# Patient Record
Sex: FEMALE | Race: OTHER | URBAN - METROPOLITAN AREA
[De-identification: names, ages, dates, MRNs, and addresses within clinical notes are randomized per-mention and may not be internally consistent; named-entity substitution may affect disease eponyms.]

---

## 2017-02-08 ENCOUNTER — APPOINTMENT (OUTPATIENT)
Dept: URBAN - METROPOLITAN AREA SURGERY 9 | Age: 67
Setting detail: DERMATOLOGY
End: 2017-02-08

## 2017-02-08 DIAGNOSIS — Z41.9 ENCOUNTER FOR PROCEDURE FOR PURPOSES OTHER THAN REMEDYING HEALTH STATE, UNSPECIFIED: ICD-10-CM

## 2017-02-08 PROCEDURE — OTHER MEDICAL CONSULTATION: PRODUCTS: OTHER

## 2017-02-28 ENCOUNTER — APPOINTMENT (OUTPATIENT)
Dept: URBAN - METROPOLITAN AREA SURGERY 9 | Age: 67
Setting detail: DERMATOLOGY
End: 2017-03-08

## 2017-02-28 DIAGNOSIS — D485 NEOPLASM OF UNCERTAIN BEHAVIOR OF SKIN: ICD-10-CM

## 2017-02-28 PROBLEM — D48.5 NEOPLASM OF UNCERTAIN BEHAVIOR OF SKIN: Status: ACTIVE | Noted: 2017-02-28

## 2017-02-28 PROCEDURE — 88305 TISSUE EXAM BY PATHOLOGIST: CPT

## 2017-02-28 PROCEDURE — OTHER PATHOLOGY BILLING: OTHER

## 2017-02-28 PROCEDURE — OTHER BIOPSY BY SHAVE METHOD: OTHER

## 2017-02-28 ASSESSMENT — LOCATION SIMPLE DESCRIPTION DERM
LOCATION SIMPLE: RIGHT RING FINGER
LOCATION SIMPLE: LEFT RING FINGER

## 2017-02-28 ASSESSMENT — LOCATION ZONE DERM: LOCATION ZONE: FINGER

## 2017-02-28 ASSESSMENT — LOCATION DETAILED DESCRIPTION DERM
LOCATION DETAILED: RIGHT PROXIMAL DORSAL RING FINGER
LOCATION DETAILED: LEFT PROXIMAL DORSAL RING FINGER

## 2017-02-28 NOTE — PROCEDURE: BIOPSY BY SHAVE METHOD
Body Location Override (Optional - Billing Will Still Be Based On Selected Body Map Location If Applicable): Left ring finger

## 2017-02-28 NOTE — PROCEDURE: BIOPSY BY SHAVE METHOD
Body Location Override (Optional - Billing Will Still Be Based On Selected Body Map Location If Applicable): Right ring finger

## 2017-07-27 ENCOUNTER — APPOINTMENT (OUTPATIENT)
Dept: URBAN - METROPOLITAN AREA SURGERY 9 | Age: 67
Setting detail: DERMATOLOGY
End: 2017-09-05

## 2017-07-27 DIAGNOSIS — Z41.9 ENCOUNTER FOR PROCEDURE FOR PURPOSES OTHER THAN REMEDYING HEALTH STATE, UNSPECIFIED: ICD-10-CM

## 2017-07-27 PROCEDURE — OTHER MEDICAL CONSULTATION: PRODUCTS: OTHER

## 2017-08-04 ENCOUNTER — APPOINTMENT (OUTPATIENT)
Dept: URBAN - METROPOLITAN AREA SURGERY 9 | Age: 67
Setting detail: DERMATOLOGY
End: 2017-09-05

## 2017-08-04 DIAGNOSIS — Z41.9 ENCOUNTER FOR PROCEDURE FOR PURPOSES OTHER THAN REMEDYING HEALTH STATE, UNSPECIFIED: ICD-10-CM

## 2017-08-04 PROCEDURE — OTHER CHEMICAL PEEL: OTHER

## 2017-08-04 ASSESSMENT — LOCATION DETAILED DESCRIPTION DERM: LOCATION DETAILED: RIGHT FOREHEAD

## 2017-08-04 ASSESSMENT — LOCATION SIMPLE DESCRIPTION DERM: LOCATION SIMPLE: RIGHT FOREHEAD

## 2017-08-04 ASSESSMENT — LOCATION ZONE DERM: LOCATION ZONE: FACE

## 2017-08-04 NOTE — PROCEDURE: CHEMICAL PEEL
Post Peel Care: After the procedure, spf was applied to the treated areas. post-care instructions were reviewed with the patient. Pt tolerated well.
Detail Level: Zone
Treatment Number: 0
Price (Use Numbers Only, No Special Characters Or $): 90
Post-Care Instructions: I reviewed with the patient in detail post-care instructions. Patient should avoid sun exposure and wear sun protection.
Chemical Peel: Skin Medica Vitalize
Treatment Time (Optional): 20 min
Prep: The treated area was degreased with pre-peel cleanser, and vaseline was applied for protection of mucous membranes.
Consent: Prior to the procedure, written consent was obtained and risks were reviewed, including but not limited to: redness, peeling, blistering, pigmentary change, scarring, infection, and pain.

## 2018-10-19 ENCOUNTER — APPOINTMENT (OUTPATIENT)
Dept: URBAN - METROPOLITAN AREA SURGERY 9 | Age: 68
Setting detail: DERMATOLOGY
End: 2018-11-14

## 2018-10-19 DIAGNOSIS — D485 NEOPLASM OF UNCERTAIN BEHAVIOR OF SKIN: ICD-10-CM

## 2018-10-19 PROBLEM — D48.5 NEOPLASM OF UNCERTAIN BEHAVIOR OF SKIN: Status: ACTIVE | Noted: 2018-10-19

## 2018-10-19 PROCEDURE — OTHER NAIL CLIPPING FOR PATHOLOGY: OTHER

## 2018-10-19 ASSESSMENT — LOCATION SIMPLE DESCRIPTION DERM: LOCATION SIMPLE: LEFT GREAT TOE

## 2018-10-19 ASSESSMENT — LOCATION DETAILED DESCRIPTION DERM: LOCATION DETAILED: LEFT GREAT TOENAIL

## 2018-10-19 ASSESSMENT — LOCATION ZONE DERM: LOCATION ZONE: TOENAIL

## 2018-10-19 NOTE — PROCEDURE: NAIL CLIPPING FOR PATHOLOGY
Billing Type: Third-Party Bill
Add [id number] To Bill?: No
Body Location Override (Optional - Billing Will Still Be Based On Selected Body Map Location If Applicable): Left great toenail
Detail Level: Detailed

## 2019-01-17 ENCOUNTER — APPOINTMENT (OUTPATIENT)
Dept: URBAN - METROPOLITAN AREA SURGERY 9 | Age: 69
Setting detail: DERMATOLOGY
End: 2019-01-17

## 2019-01-17 DIAGNOSIS — Z41.9 ENCOUNTER FOR PROCEDURE FOR PURPOSES OTHER THAN REMEDYING HEALTH STATE, UNSPECIFIED: ICD-10-CM

## 2019-01-17 PROCEDURE — OTHER PRESCRIPTION: OTHER

## 2019-01-17 PROCEDURE — OTHER PRODUCT LINE (PHARMACEUTICALS): OTHER

## 2019-01-17 PROCEDURE — OTHER COSMETIC CONSULTATION: LASER RESURFACING: OTHER

## 2019-01-17 PROCEDURE — OTHER OTHER: OTHER

## 2019-01-17 PROCEDURE — OTHER PRODUCT LINE (ANTI-AGING): OTHER

## 2019-01-17 RX ORDER — TRETINOIN 0.25 MG/G
CREAM TOPICAL QHS
Qty: 1 | Refills: 5 | COMMUNITY
Start: 2019-01-17 | End: 2019-07-02

## 2019-01-17 NOTE — PROCEDURE: PRODUCT LINE (ANTI-AGING)
Product 46 Units: 0
Product 5 Application Directions: qhs to face; retinoid handout given to patient
Product 12 Application Directions: apply bid to eyelids
Product 44 Price (In Dollars - Numeric Only, No Special Characters Or $): 0.00
Product 22 Price (In Dollars - Numeric Only, No Special Characters Or $): 28.00
Product 20 Price (In Dollars - Numeric Only, No Special Characters Or $): 48.00
Product 27 Price (In Dollars - Numeric Only, No Special Characters Or $): 38.00
Product 10 Price (In Dollars - Numeric Only, No Special Characters Or $): 58.00
Product 3 Price (In Dollars - Numeric Only, No Special Characters Or $): 30.00
Name Of Product 24: Skin Better Science Alpha-Ret Intense
Product 15 Price (In Dollars - Numeric Only, No Special Characters Or $): 150.00
Name Of Product 26: Alto Serum
Product 18 Price (In Dollars - Numeric Only, No Special Characters Or $): 116.00
Name Of Product 16: Avene Clean-AC moisturizer
Name Of Product 6: Neocutis Biocream
Name Of Product 13: Velvet Patterson
Name Of Product 1: Skin Better Science AlphaRet
Product 20 Application Directions: bid to face
Product 3 Application Directions: qd
Product 10 Application Directions: apply bid
Product 22 Application Directions: use to wash face twice a day
Product 1 Price (In Dollars - Numeric Only, No Special Characters Or $): 110.00
Product 27 Application Directions: use bid to wash face
Product 26 Price (In Dollars - Numeric Only, No Special Characters Or $): 145.00
Product 13 Price (In Dollars - Numeric Only, No Special Characters Or $): 99.00
Product 8 Price (In Dollars - Numeric Only, No Special Characters Or $): 172.00
Product 16 Price (In Dollars - Numeric Only, No Special Characters Or $): 20.00
Name Of Product 4: Neocutis Lumiere Eye Cream
Product 18 Application Directions: Apply bid to face and once a week to neck and chest
Name Of Product 21: Avene Anti-Benigno FORT
Name Of Product 11: Obagi 20% vit C serum
Name Of Product 28: Skin Medica Dermal Repair Cream
Product 1 Application Directions: apply qhs to face; retinoid handout given to patient
Product 6 Application Directions: apply qd-bid
Product 13 Application Directions: apply qd-bid around eyes
Detail Level: Zone
Product 4 Price (In Dollars - Numeric Only, No Special Characters Or $): 80.00
Name Of Product 19: Environ C-[facility] 2
Name Of Product 23: [place]
Product 24 Application Directions: apply to affected areas qhs; retinoid handout given
Product 28 Price (In Dollars - Numeric Only, No Special Characters Or $): 129.00
Name Of Product 2: Obagi Hydrate Luxe face cream
Name Of Product 7: Avene Tolerance Extreme moisturizer
Name Of Product 14: Neocutis Bioserum
Name Of Product 9: Avene cleansing foam
Product 19 Price (In Dollars - Numeric Only, No Special Characters Or $): 121.00
Name Of Product 17: Avene anti-rogeurs Cleansing Milk
Product 23 Price (In Dollars - Numeric Only, No Special Characters Or $): 165.00
Allow Plan To Count Towards E/M Coding: Yes
Name Of Product 25: Tretinoin 0.1% cream
Product 11 Application Directions: apply daily to face and neck
Product 2 Price (In Dollars - Numeric Only, No Special Characters Or $): 72
Product 21 Application Directions: use twice a day to moisturize face
Product 7 Price (In Dollars - Numeric Only, No Special Characters Or $): 38
Product 14 Price (In Dollars - Numeric Only, No Special Characters Or $): 225.00
Product 28 Application Directions: apply bid to face and neck
Product 2 Units: 1
Product 19 Application Directions: Apply qhs to face; retinoid handout given
Product 26 Application Directions: Apply qam
Name Of Product 5: Skin Medica Retinol 1%
Name Of Product 12: Skin Better Science Eye cream
Product 25 Price (In Dollars - Numeric Only, No Special Characters Or $): 102.00
Name Of Product 22: Avene Anti-Rogeurs Cleanser Milk
Product 14 Application Directions: apply qd to face
Product 12 Price (In Dollars - Numeric Only, No Special Characters Or $): 100.00
Product 23 Application Directions: Use bid to moisturize face and neck
Name Of Product 27: Skin Medica Facial Cleanser
Product 5 Price (In Dollars - Numeric Only, No Special Characters Or $): 90.00
Name Of Product 20: Avene Anti-Rogeurs FORT moisturizer
Name Of Product 8: TNS Recovery complex
Name Of Product 3: Elta MD SPF 46 clear
Name Of Product 10: SkinMedica Rejuvenative moisturizer
Product 25 Application Directions: apply qhs to face; Retinoid handout given to patient
Name Of Product 15: Sente Dermal Repair Cream
Name Of Product 18: Environ C-quence 1
Product 15 Application Directions: Apply bid to face

## 2019-01-17 NOTE — PROCEDURE: OTHER
Other (Free Text): 75% discount on treatment cost
Note Text (......Xxx Chief Complaint.): This diagnosis correlates with the
Detail Level: Detailed

## 2019-01-17 NOTE — PROCEDURE: PRODUCT LINE (PHARMACEUTICALS)
Product 5 Application Directions: Apply to affected area daily. Handout given
Product 8 Price (In Dollars - Numeric Only, No Special Characters Or $): 0.00
Product 3 Units: 0
Product 1 Price (In Dollars - Numeric Only, No Special Characters Or $): 120
Product 3 Application Directions: apply as directed
Detail Level: Zone
Name Of Product 4: Tretinoin 0.1%
Product 1 Application Directions: apply qd to affected area
Name Of Product 6: Tretinoin 0.05%
Name Of Product 2: Latisse 5ML
Product 4 Price (In Dollars - Numeric Only, No Special Characters Or $): 102.00
Product 6 Price (In Dollars - Numeric Only, No Special Characters Or $): 91.00
Allow Plan To Count Towards E/M Coding: Yes
Product 2 Price (In Dollars - Numeric Only, No Special Characters Or $): 179.00
Product 4 Application Directions: apply qhs to face; Retinoid handout given to patient
Product 6 Application Directions: apply to affected areas qhs.  Retinoid handout given.  discontinue if pregnancy suspected
Name Of Product 5: Tretinion 0.025% 20 g
Name Of Product 7: Hydro-Q 4%
Name Of Product 3: Latisse 5ML x 2
Product 7 Price (In Dollars - Numeric Only, No Special Characters Or $): 69.90
Product 5 Price (In Dollars - Numeric Only, No Special Characters Or $): 84.00
Product 3 Price (In Dollars - Numeric Only, No Special Characters Or $): 300
Name Of Product 1: Latisse 3ML
Product 7 Application Directions: Use as directed.  Bleaching cream handout given
Product 5 Units: 1

## 2019-07-02 ENCOUNTER — APPOINTMENT (OUTPATIENT)
Dept: URBAN - METROPOLITAN AREA SURGERY 9 | Age: 69
Setting detail: DERMATOLOGY
End: 2019-07-09

## 2019-07-02 DIAGNOSIS — L57.8 OTHER SKIN CHANGES DUE TO CHRONIC EXPOSURE TO NONIONIZING RADIATION: ICD-10-CM

## 2019-07-02 DIAGNOSIS — I78.1 NEVUS, NON-NEOPLASTIC: ICD-10-CM

## 2019-07-02 DIAGNOSIS — L71.0 PERIORAL DERMATITIS: ICD-10-CM

## 2019-07-02 PROCEDURE — OTHER PRESCRIPTION: OTHER

## 2019-07-02 PROCEDURE — OTHER TREATMENT REGIMEN: OTHER

## 2019-07-02 PROCEDURE — 99212 OFFICE O/P EST SF 10 MIN: CPT

## 2019-07-02 PROCEDURE — OTHER COUNSELING: OTHER

## 2019-07-02 PROCEDURE — OTHER REASSURANCE: OTHER

## 2019-07-02 RX ORDER — CLINDAMYCIN PHOSPHATE 10 MG/G
GEL TOPICAL
Qty: 1 | Refills: 2 | Status: ERX | COMMUNITY
Start: 2019-07-02

## 2019-07-02 RX ORDER — AMOXICILLIN 500 MG/1
TABLET, FILM COATED ORAL
Qty: 60 | Refills: 1 | Status: ERX | COMMUNITY
Start: 2019-07-02

## 2019-07-02 ASSESSMENT — LOCATION SIMPLE DESCRIPTION DERM
LOCATION SIMPLE: NOSE
LOCATION SIMPLE: RIGHT NOSE
LOCATION SIMPLE: LEFT NOSE
LOCATION SIMPLE: UPPER LIP

## 2019-07-02 ASSESSMENT — LOCATION DETAILED DESCRIPTION DERM
LOCATION DETAILED: NASAL SUPRATIP
LOCATION DETAILED: PHILTRUM
LOCATION DETAILED: LEFT NASAL ALA
LOCATION DETAILED: RIGHT NASAL ALA

## 2019-07-02 ASSESSMENT — LOCATION ZONE DERM
LOCATION ZONE: LIP
LOCATION ZONE: NOSE

## 2019-07-02 NOTE — PROCEDURE: TREATMENT REGIMEN
Initiate Treatment: Clindamycin gel as directed\\nSent in Amoxicillin 500 mg( pt to call to fill)
Detail Level: Simple
Plan: Recommends treating area with 5FU first in the winter; I feel she has some mild AKs along with solar lentigines.  We'll first treat with 5-FU, possibly entire face and then get onto a peel program, along with hydro-q/tretinoin regimen

## 2019-07-02 NOTE — HPI: RASH
Is This A New Presentation, Or A Follow-Up?: Rash
Additional History: Initially treated with otc cortisone, did not help.Uses metronidazole 0.75% twice daily , stopped a few days ago. Had use acanya in the past.

## 2019-07-02 NOTE — HPI: SECONDARY COMPLAINT
Additional History:  thinks it’s a vein
Additional History: Wants to talk about cosmetic treatment to brown spots on upper lip

## 2019-07-02 NOTE — PROCEDURE: REASSURANCE
Hide Additional Notes?: No
Detail Level: Detailed
Additional Notes (Optional): Recommends that if pt wants treated, to do this in the fall (best time for healing).

## 2019-07-22 ENCOUNTER — APPOINTMENT (OUTPATIENT)
Dept: URBAN - METROPOLITAN AREA SURGERY 9 | Age: 69
Setting detail: DERMATOLOGY
End: 2019-07-22

## 2019-07-22 DIAGNOSIS — I78.1 NEVUS, NON-NEOPLASTIC: ICD-10-CM

## 2019-07-22 PROCEDURE — OTHER EXCEL V LASER: OTHER

## 2019-07-22 ASSESSMENT — LOCATION ZONE DERM: LOCATION ZONE: NOSE

## 2019-07-22 ASSESSMENT — LOCATION DETAILED DESCRIPTION DERM: LOCATION DETAILED: NASAL TIP

## 2019-07-22 ASSESSMENT — LOCATION SIMPLE DESCRIPTION DERM: LOCATION SIMPLE: NOSE

## 2019-07-22 NOTE — PROCEDURE: EXCEL V LASER
Post-Care Instructions: I reviewed with the patient in detail post-care instructions. Patient is to apply vaseline with a q-tip to all crusted areas, and avoid picking at any scabs. Pt should stay away from the sun and wear sun protection until fully healed.
Spot Size: 5
Temperature: 10 C
Procedural Text: The treatment areas where then treated as noted above.
Treatment Number (Will Not Render If 0): 1
Fluence In J: 11
Detail Level: Detailed
Consent: Written consent obtained, risks reviewed including but not limited to crusting, scabbing, blistering, scarring, darker or lighter pigmentary change, and/or incomplete removal.
Post-Procedure Text: Following the treatment, Aquaphor and broad spectrum sunscreen was applied to the treatment areas.  Post-care instructions were discussed.
Laser Type: KTP 532nm
Pulse Width In Msec: 10
Pre-Procedure Text: After consent was obtained and the procedure was explained, all persons present in the exam room put on their protective eyewear. Cold gel was applied to the treatment areas.
Price (Use Numbers Only, No Special Characters Or $): 0

## 2019-12-12 ENCOUNTER — APPOINTMENT (OUTPATIENT)
Dept: URBAN - METROPOLITAN AREA SURGERY 9 | Age: 69
Setting detail: DERMATOLOGY
End: 2019-12-12

## 2019-12-12 DIAGNOSIS — L60.8 OTHER NAIL DISORDERS: ICD-10-CM

## 2019-12-12 DIAGNOSIS — L64.8 OTHER ANDROGENIC ALOPECIA: ICD-10-CM

## 2019-12-12 PROCEDURE — OTHER COUNSELING: OTHER

## 2019-12-12 PROCEDURE — OTHER ORDER TESTS: OTHER

## 2019-12-12 PROCEDURE — 99213 OFFICE O/P EST LOW 20 MIN: CPT

## 2019-12-12 PROCEDURE — OTHER TREATMENT REGIMEN: OTHER

## 2019-12-12 ASSESSMENT — LOCATION SIMPLE DESCRIPTION DERM
LOCATION SIMPLE: RIGHT INDEX FINGERNAIL
LOCATION SIMPLE: ANTERIOR SCALP

## 2019-12-12 ASSESSMENT — LOCATION DETAILED DESCRIPTION DERM
LOCATION DETAILED: RIGHT INDEX FINGERNAIL
LOCATION DETAILED: MID-FRONTAL SCALP

## 2019-12-12 ASSESSMENT — LOCATION ZONE DERM
LOCATION ZONE: FINGERNAIL
LOCATION ZONE: SCALP

## 2019-12-12 NOTE — PROCEDURE: TREATMENT REGIMEN
Otc Regimen: Rogaine 5% solution or foam once daily\\nFull again by Sher Titus on [employer] \\nVivascal and/or collagen powder(vital proteins at [facility]) \\nTopix Otc Regimen: Rogaine 5% solution or foam once daily\\nFull again by Sher Titus on [employer] \\nVivascal and/or collagen powder(vital proteins at [employer]) \\nTopix

## 2019-12-12 NOTE — HPI: HAIR LOSS
Previous Labs: No
How Did The Hair Loss Occur?: gradual in onset
What Hair Products Do You Use?: Several different shampoos

## 2020-03-17 ENCOUNTER — APPOINTMENT (OUTPATIENT)
Dept: URBAN - METROPOLITAN AREA SURGERY 9 | Age: 70
Setting detail: DERMATOLOGY
End: 2020-03-17

## 2020-03-17 DIAGNOSIS — L65.0 TELOGEN EFFLUVIUM: ICD-10-CM

## 2020-03-17 DIAGNOSIS — L21.8 OTHER SEBORRHEIC DERMATITIS: ICD-10-CM

## 2020-03-17 DIAGNOSIS — L57.0 ACTINIC KERATOSIS: ICD-10-CM

## 2020-03-17 PROCEDURE — OTHER PRESCRIPTION: OTHER

## 2020-03-17 PROCEDURE — 99213 OFFICE O/P EST LOW 20 MIN: CPT | Mod: 25

## 2020-03-17 PROCEDURE — OTHER COUNSELING: OTHER

## 2020-03-17 PROCEDURE — 17000 DESTRUCT PREMALG LESION: CPT

## 2020-03-17 PROCEDURE — OTHER LIQUID NITROGEN: OTHER

## 2020-03-17 PROCEDURE — OTHER TREATMENT REGIMEN: OTHER

## 2020-03-17 RX ORDER — FLUOCINONIDE 0.5 MG/ML
SOLUTION TOPICAL
Qty: 1 | Refills: 2 | Status: ERX | COMMUNITY
Start: 2020-03-17

## 2020-03-17 RX ORDER — CICLOPIROX 10 MG/.96ML
SHAMPOO TOPICAL
Qty: 1 | Refills: 5 | Status: ERX | COMMUNITY
Start: 2020-03-17

## 2020-03-17 ASSESSMENT — LOCATION DETAILED DESCRIPTION DERM
LOCATION DETAILED: MID-FRONTAL SCALP
LOCATION DETAILED: MID-OCCIPITAL SCALP
LOCATION DETAILED: LEFT SUPERIOR FOREHEAD

## 2020-03-17 ASSESSMENT — LOCATION SIMPLE DESCRIPTION DERM
LOCATION SIMPLE: POSTERIOR SCALP
LOCATION SIMPLE: LEFT FOREHEAD
LOCATION SIMPLE: ANTERIOR SCALP

## 2020-03-17 ASSESSMENT — LOCATION ZONE DERM
LOCATION ZONE: SCALP
LOCATION ZONE: FACE

## 2020-03-17 NOTE — PROCEDURE: TREATMENT REGIMEN
Otc Regimen: Rogaine 5% solution or foam once daily\\nVivascal and/or collagen powder(vital proteins at [facility]) \\nTopix Otc Regimen: Rogaine 5% solution or foam once daily\\nVivascal and/or collagen powder(vital proteins at [employer]) \\nTopix

## 2020-10-15 ENCOUNTER — APPOINTMENT (OUTPATIENT)
Dept: URBAN - METROPOLITAN AREA SURGERY 9 | Age: 70
Setting detail: DERMATOLOGY
End: 2020-10-15

## 2020-10-15 DIAGNOSIS — H01.13 ECZEMATOUS DERMATITIS OF EYELID: ICD-10-CM

## 2020-10-15 DIAGNOSIS — L29.2 PRURITUS VULVAE: ICD-10-CM

## 2020-10-15 PROBLEM — H01.134 ECZEMATOUS DERMATITIS OF LEFT UPPER EYELID: Status: ACTIVE | Noted: 2020-10-15

## 2020-10-15 PROBLEM — H01.132 ECZEMATOUS DERMATITIS OF RIGHT LOWER EYELID: Status: ACTIVE | Noted: 2020-10-15

## 2020-10-15 PROBLEM — H01.131 ECZEMATOUS DERMATITIS OF RIGHT UPPER EYELID: Status: ACTIVE | Noted: 2020-10-15

## 2020-10-15 PROBLEM — H01.135 ECZEMATOUS DERMATITIS OF LEFT LOWER EYELID: Status: ACTIVE | Noted: 2020-10-15

## 2020-10-15 PROCEDURE — OTHER PRESCRIPTION: OTHER

## 2020-10-15 PROCEDURE — OTHER COUNSELING: OTHER

## 2020-10-15 PROCEDURE — 99213 OFFICE O/P EST LOW 20 MIN: CPT

## 2020-10-15 PROCEDURE — OTHER TREATMENT REGIMEN: OTHER

## 2020-10-15 RX ORDER — TACROLIMUS 1 MG/G
OINTMENT TOPICAL BID
Qty: 1 | Refills: 3 | Status: ERX | COMMUNITY
Start: 2020-10-15

## 2020-10-15 RX ORDER — DESONIDE 0.5 MG/G
OINTMENT TOPICAL
Qty: 1 | Refills: 1 | Status: ERX | COMMUNITY
Start: 2020-10-15

## 2020-10-15 ASSESSMENT — LOCATION DETAILED DESCRIPTION DERM
LOCATION DETAILED: LEFT LATERAL INFERIOR EYELID
LOCATION DETAILED: LEFT MEDIAL SUPERIOR EYELID
LOCATION DETAILED: RIGHT LATERAL INFERIOR EYELID
LOCATION DETAILED: RIGHT MEDIAL SUPERIOR EYELID
LOCATION DETAILED: LEFT LABIUM MAJUS
LOCATION DETAILED: RIGHT MEDIAL SUPERIOR EYELID

## 2020-10-15 ASSESSMENT — SEVERITY ASSESSMENT: SEVERITY: MILD

## 2020-10-15 ASSESSMENT — LOCATION ZONE DERM
LOCATION ZONE: EYELID
LOCATION ZONE: EYELID
LOCATION ZONE: VULVA

## 2020-10-15 ASSESSMENT — LOCATION SIMPLE DESCRIPTION DERM
LOCATION SIMPLE: RIGHT INFERIOR EYELID
LOCATION SIMPLE: LABIA MAJORA
LOCATION SIMPLE: LEFT INFERIOR EYELID
LOCATION SIMPLE: RIGHT SUPERIOR EYELID
LOCATION SIMPLE: RIGHT SUPERIOR EYELID
LOCATION SIMPLE: LEFT SUPERIOR EYELID

## 2020-10-15 NOTE — PROCEDURE: TREATMENT REGIMEN
Initiate Treatment: Protopic ointment as directed
Otc Regimen: Start mild moisturizer and continue skin medica cleanser
Samples Given: [place] Posay moisturizers
Detail Level: Simple
Plan: Consider washing mask with All Free & Clear detergent\\nInstructed to call for metronidazole gel if needed for persistent inflammatory papules on cheeks
Initiate Treatment: Alternate desonide ointment with protopic (one week with one, then one week another)
Detail Level: Zone
Otc Regimen: coconut oil; avoid rough tissue paper, soaps

## 2020-11-02 ENCOUNTER — APPOINTMENT (OUTPATIENT)
Dept: URBAN - METROPOLITAN AREA SURGERY 9 | Age: 70
Setting detail: DERMATOLOGY
End: 2020-11-02

## 2020-11-02 DIAGNOSIS — L259 CONTACT DERMATITIS AND OTHER ECZEMA, UNSPECIFIED CAUSE: ICD-10-CM

## 2020-11-02 DIAGNOSIS — B88.0 OTHER ACARIASIS: ICD-10-CM

## 2020-11-02 PROBLEM — L23.9 ALLERGIC CONTACT DERMATITIS, UNSPECIFIED CAUSE: Status: ACTIVE | Noted: 2020-11-02

## 2020-11-02 PROCEDURE — 87220 TISSUE EXAM FOR FUNGI: CPT

## 2020-11-02 PROCEDURE — OTHER KOH PREP: OTHER

## 2020-11-02 PROCEDURE — 99213 OFFICE O/P EST LOW 20 MIN: CPT | Mod: 25

## 2020-11-02 PROCEDURE — OTHER PATCH TESTING: OTHER

## 2020-11-02 PROCEDURE — OTHER PRESCRIPTION: OTHER

## 2020-11-02 PROCEDURE — OTHER TREATMENT REGIMEN: OTHER

## 2020-11-02 PROCEDURE — 95044 PATCH/APPLICATION TESTS: CPT

## 2020-11-02 PROCEDURE — OTHER COUNSELING: OTHER

## 2020-11-02 RX ORDER — PERMETHRIN 50 MG/G
CREAM TOPICAL
Qty: 1 | Refills: 1 | Status: ERX | COMMUNITY
Start: 2020-11-02

## 2020-11-02 ASSESSMENT — LOCATION SIMPLE DESCRIPTION DERM
LOCATION SIMPLE: LEFT CHEEK
LOCATION SIMPLE: LEFT SUPERIOR EYELID
LOCATION SIMPLE: RIGHT CHEEK
LOCATION SIMPLE: RIGHT SUPERIOR EYELID

## 2020-11-02 ASSESSMENT — LOCATION DETAILED DESCRIPTION DERM
LOCATION DETAILED: LEFT SUPERIOR CENTRAL MALAR CHEEK
LOCATION DETAILED: RIGHT CENTRAL MALAR CHEEK
LOCATION DETAILED: LEFT MEDIAL SUPERIOR EYELID
LOCATION DETAILED: RIGHT MEDIAL SUPERIOR EYELID

## 2020-11-02 ASSESSMENT — LOCATION ZONE DERM
LOCATION ZONE: EYELID
LOCATION ZONE: FACE

## 2020-11-02 NOTE — PROCEDURE: PATCH TESTING
Consent: Risks reviewed (including but not limited to rash, itching, allergic reaction, systemic rash, remote possibility of anaphylaxis to allergen.)
Detail Level: Zone
Post-Care Instructions: I reviewed with the patient in detail post-care instructions. Patient should try to avoid sweating, picking at, or getting patches wet for 48 hours.
Number Of Patches (Maximum Allowable Per Dos By Cms Is 90): 36

## 2020-11-02 NOTE — PROCEDURE: TREATMENT REGIMEN
Continue Regimen: Protopic ointment as directed
Otc Regimen: Continue mild moisturizer and cleansers
Detail Level: Simple
Plan: Recommended the patient undergo patch testing since persistent and recurred rapidly with stopping tacrolimus
Initiate Treatment: Permethrin cream nightly to cheeks for up to 1 month, can stop once rash clears. Resume PRN.
Plan: If not improving in 1 month advised patient to call for an alternate Rx treatment
Detail Level: Zone

## 2020-11-02 NOTE — PROCEDURE: KOH PREP
Body Location Override (Optional - Billing Will Still Be Based On Selected Body Map Location If Applicable): left cheek
Detail Level: Simple
Showing: no pathologic findings
Koh Intro Text (From The.....): A KOH prep was ordered and evaluated from the
Koh Procedure Text (Tissue Harvesting Technique): A glass slide was used to scrape the skin. The skin scrapings were placed on a glass slide, covered with a coverslip and a KOH solution was applied.

## 2020-11-04 ENCOUNTER — APPOINTMENT (OUTPATIENT)
Dept: URBAN - METROPOLITAN AREA SURGERY 9 | Age: 70
Setting detail: DERMATOLOGY
End: 2021-03-25

## 2020-11-04 DIAGNOSIS — L259 CONTACT DERMATITIS AND OTHER ECZEMA, UNSPECIFIED CAUSE: ICD-10-CM

## 2020-11-04 PROBLEM — L23.9 ALLERGIC CONTACT DERMATITIS, UNSPECIFIED CAUSE: Status: ACTIVE | Noted: 2020-11-04

## 2020-11-04 PROCEDURE — 99211 OFF/OP EST MAY X REQ PHY/QHP: CPT

## 2020-11-04 PROCEDURE — OTHER TRUE TEST READING: OTHER

## 2020-11-04 NOTE — PROCEDURE: TRUE TEST READING
Epoxy Resin: no reaction
Number Of Patches Read: 36
Show Allergen Counseling In The Note?: No
What Reading Time Point?: 48 hour
Show Negative Results In The Note?: Yes
Detail Level: Zone
Gold Sodium Thiosulfate: irritant reaction

## 2020-11-06 ENCOUNTER — APPOINTMENT (OUTPATIENT)
Dept: URBAN - METROPOLITAN AREA SURGERY 9 | Age: 70
Setting detail: DERMATOLOGY
End: 2020-11-06

## 2020-11-06 DIAGNOSIS — B88.0 OTHER ACARIASIS: ICD-10-CM

## 2020-11-06 DIAGNOSIS — L259 CONTACT DERMATITIS AND OTHER ECZEMA, UNSPECIFIED CAUSE: ICD-10-CM

## 2020-11-06 PROBLEM — L23.9 ALLERGIC CONTACT DERMATITIS, UNSPECIFIED CAUSE: Status: ACTIVE | Noted: 2020-11-06

## 2020-11-06 PROCEDURE — OTHER TREATMENT REGIMEN: OTHER

## 2020-11-06 PROCEDURE — 99213 OFFICE O/P EST LOW 20 MIN: CPT

## 2020-11-06 PROCEDURE — OTHER TRUE TEST READING: OTHER

## 2020-11-06 PROCEDURE — OTHER COUNSELING: OTHER

## 2020-11-06 ASSESSMENT — LOCATION DETAILED DESCRIPTION DERM
LOCATION DETAILED: LEFT CENTRAL MALAR CHEEK
LOCATION DETAILED: RIGHT MEDIAL SUPERIOR EYELID
LOCATION DETAILED: INFERIOR MID FOREHEAD
LOCATION DETAILED: LEFT MEDIAL SUPERIOR EYELID
LOCATION DETAILED: RIGHT CENTRAL MALAR CHEEK

## 2020-11-06 ASSESSMENT — LOCATION ZONE DERM
LOCATION ZONE: EYELID
LOCATION ZONE: FACE

## 2020-11-06 ASSESSMENT — LOCATION SIMPLE DESCRIPTION DERM
LOCATION SIMPLE: LEFT CHEEK
LOCATION SIMPLE: RIGHT SUPERIOR EYELID
LOCATION SIMPLE: RIGHT CHEEK
LOCATION SIMPLE: INFERIOR FOREHEAD
LOCATION SIMPLE: LEFT SUPERIOR EYELID

## 2020-11-06 NOTE — PROCEDURE: TREATMENT REGIMEN
Detail Level: Zone
Continue Regimen: Protopic ointment when needed
Plan: Avoid anything with gold sodium thiosulfate for at least a month. If she clears up, consider this the culprit. If she still continues to flare, can refer to Dr. De Jesus for further eval and tx
Plan: If not improving, advised patient to call for alternate Rx treatments
Continue Regimen: Permethrin cream as directed

## 2020-11-06 NOTE — PROCEDURE: TRUE TEST READING
Epoxy Resin: no reaction
Number Of Patches Read: 36
Show Allergen Counseling In The Note?: No
What Reading Time Point?: 48 hour
Show Negative Results In The Note?: Yes
Detail Level: Zone
Gold Sodium Thiosulfate: 3+

## 2020-12-04 ENCOUNTER — RX ONLY (RX ONLY)
Age: 70
End: 2020-12-04

## 2020-12-04 RX ORDER — IVERMECTIN 10 MG/G
CREAM TOPICAL
Qty: 1 | Refills: 5 | Status: ERX | COMMUNITY
Start: 2020-12-04

## 2020-12-30 ENCOUNTER — RX ONLY (RX ONLY)
Age: 70
End: 2020-12-30

## 2020-12-30 RX ORDER — CEPHALEXIN 500 MG/1
CAPSULE ORAL
Qty: 14 | Refills: 0 | Status: ERX | COMMUNITY
Start: 2020-12-30

## 2021-08-02 ENCOUNTER — RX ONLY (RX ONLY)
Age: 71
End: 2021-08-02

## 2021-08-02 RX ORDER — DESOXIMETASONE 0.5 MG/G
GEL TOPICAL
Qty: 1 | Refills: 3 | Status: ERX | COMMUNITY
Start: 2021-08-02

## 2021-12-06 ENCOUNTER — RX ONLY (RX ONLY)
Age: 71
End: 2021-12-06

## 2021-12-06 RX ORDER — FLUOCINONIDE 0.5 MG/ML
SOLUTION TOPICAL
Qty: 60 | Refills: 3 | Status: ERX

## 2022-05-26 ENCOUNTER — APPOINTMENT (OUTPATIENT)
Dept: URBAN - METROPOLITAN AREA SURGERY 9 | Age: 72
Setting detail: DERMATOLOGY
End: 2022-05-27

## 2022-05-26 DIAGNOSIS — Z41.9 ENCOUNTER FOR PROCEDURE FOR PURPOSES OTHER THAN REMEDYING HEALTH STATE, UNSPECIFIED: ICD-10-CM

## 2022-05-26 PROCEDURE — OTHER COSMETIC CONSULTATION: LASER RESURFACING: OTHER

## 2022-05-26 PROCEDURE — OTHER COSMETIC CONSULTATION: FILLERS: OTHER

## 2022-05-26 PROCEDURE — OTHER OTHER: OTHER

## 2022-05-26 ASSESSMENT — LOCATION SIMPLE DESCRIPTION DERM
LOCATION SIMPLE: RIGHT CHEEK
LOCATION SIMPLE: LEFT CHEEK

## 2022-05-26 ASSESSMENT — LOCATION ZONE DERM: LOCATION ZONE: FACE

## 2022-05-26 ASSESSMENT — LOCATION DETAILED DESCRIPTION DERM
LOCATION DETAILED: RIGHT MEDIAL BUCCAL CHEEK
LOCATION DETAILED: LEFT CENTRAL BUCCAL CHEEK
LOCATION DETAILED: LEFT INFERIOR CENTRAL MALAR CHEEK

## 2022-05-26 NOTE — PROCEDURE: OTHER
Detail Level: Zone
Note Text (......Xxx Chief Complaint.): This diagnosis correlates with the
Render Risk Assessment In Note?: no
Other (Free Text): Jennifer most bothered by the lines around her mouth.  Has been hesitant to do fillers.  States she had it done 20+ years ago and recalled feeling \"lumpy\" with treatment.  \\n\\nI do feel should would do great with fractionated CO2 laser treatment (1-2 treatments; 2nd treatment would be focused around the mouth).  Forehead and lateral cheeks have dyschromia. Perioral lines are deep as well as a few of the vertical creases lateral to the oral commissures.  \\n\\nFiller may still be recommended after CO2 laser tx(s) -- Volbella to perioral rhytides and to medial cheek creases.  Will discuss in more detail at a later time if we need to go this route.

## 2022-12-27 ENCOUNTER — APPOINTMENT (OUTPATIENT)
Dept: URBAN - METROPOLITAN AREA SURGERY 9 | Age: 72
Setting detail: DERMATOLOGY
End: 2022-12-28

## 2022-12-27 DIAGNOSIS — Z41.9 ENCOUNTER FOR PROCEDURE FOR PURPOSES OTHER THAN REMEDYING HEALTH STATE, UNSPECIFIED: ICD-10-CM

## 2022-12-27 PROCEDURE — OTHER FACIAL: OTHER

## 2022-12-27 PROCEDURE — OTHER INVENTORY: OTHER

## 2022-12-27 ASSESSMENT — LOCATION DETAILED DESCRIPTION DERM: LOCATION DETAILED: RIGHT MEDIAL FOREHEAD

## 2022-12-27 ASSESSMENT — LOCATION ZONE DERM: LOCATION ZONE: FACE

## 2022-12-27 ASSESSMENT — LOCATION SIMPLE DESCRIPTION DERM: LOCATION SIMPLE: RIGHT FOREHEAD

## 2022-12-27 NOTE — PROCEDURE: FACIAL
Detail Level: Zone
Price (Use Numbers Only, No Special Characters Or $): 45
Extraction Method: extractor
Mask Type (Optional): calming
Exfoliation Type Override: orange enzyme
Exfoliation Type: enzyme
Facial Steaming: steamed

## 2023-02-20 ENCOUNTER — APPOINTMENT (OUTPATIENT)
Dept: URBAN - METROPOLITAN AREA SURGERY 9 | Age: 73
Setting detail: DERMATOLOGY
End: 2023-02-20

## 2023-02-20 DIAGNOSIS — D22 MELANOCYTIC NEVI: ICD-10-CM

## 2023-02-20 DIAGNOSIS — L72.0 EPIDERMAL CYST: ICD-10-CM

## 2023-02-20 DIAGNOSIS — L57.8 OTHER SKIN CHANGES DUE TO CHRONIC EXPOSURE TO NONIONIZING RADIATION: ICD-10-CM

## 2023-02-20 PROBLEM — D22.61 MELANOCYTIC NEVI OF RIGHT UPPER LIMB, INCLUDING SHOULDER: Status: ACTIVE | Noted: 2023-02-20

## 2023-02-20 PROBLEM — D22.5 MELANOCYTIC NEVI OF TRUNK: Status: ACTIVE | Noted: 2023-02-20

## 2023-02-20 PROCEDURE — OTHER REASSURANCE: OTHER

## 2023-02-20 PROCEDURE — 99213 OFFICE O/P EST LOW 20 MIN: CPT

## 2023-02-20 PROCEDURE — OTHER MIPS QUALITY: OTHER

## 2023-02-20 PROCEDURE — OTHER OTHER: OTHER

## 2023-02-20 PROCEDURE — OTHER SUNSCREEN RECOMMENDATIONS: OTHER

## 2023-02-20 ASSESSMENT — LOCATION DETAILED DESCRIPTION DERM
LOCATION DETAILED: MID POSTERIOR NECK
LOCATION DETAILED: LEFT INFERIOR MEDIAL MALAR CHEEK
LOCATION DETAILED: RIGHT VENTRAL DISTAL FOREARM
LOCATION DETAILED: PERIUMBILICAL SKIN

## 2023-02-20 ASSESSMENT — LOCATION ZONE DERM
LOCATION ZONE: ARM
LOCATION ZONE: NECK
LOCATION ZONE: TRUNK
LOCATION ZONE: FACE

## 2023-02-20 ASSESSMENT — LOCATION SIMPLE DESCRIPTION DERM
LOCATION SIMPLE: POSTERIOR NECK
LOCATION SIMPLE: ABDOMEN
LOCATION SIMPLE: RIGHT FOREARM
LOCATION SIMPLE: LEFT CHEEK

## 2023-02-20 NOTE — PROCEDURE: OTHER
Render Risk Assessment In Note?: no
Note Text (......Xxx Chief Complaint.): This diagnosis correlates with the cysts on scalp
Other (Free Text): Acne surgery
Detail Level: Simple

## 2023-02-20 NOTE — PROCEDURE: REASSURANCE
Detail Level: Generalized
Hide Include Location In Plan Question?: No
Include Location In Plan?: Yes
Detail Level: Simple
Detail Level: Zone
Additional Note: Pt counseled on CO2 laser if interested\\nNeova sunscreen

## 2023-03-31 ENCOUNTER — RX ONLY (RX ONLY)
Age: 73
End: 2023-03-31

## 2023-03-31 RX ORDER — VALACYCLOVIR HYDROCHLORIDE 500 MG/1
TABLET, FILM COATED ORAL
Qty: 10 | Refills: 0 | Status: ERX | COMMUNITY
Start: 2023-03-31

## 2023-04-03 ENCOUNTER — APPOINTMENT (OUTPATIENT)
Dept: URBAN - METROPOLITAN AREA SURGERY 9 | Age: 73
Setting detail: DERMATOLOGY
End: 2023-04-03

## 2023-04-03 DIAGNOSIS — Z41.9 ENCOUNTER FOR PROCEDURE FOR PURPOSES OTHER THAN REMEDYING HEALTH STATE, UNSPECIFIED: ICD-10-CM

## 2023-04-03 PROCEDURE — OTHER OTHER (COSMETIC): OTHER

## 2023-04-03 PROCEDURE — OTHER MIPS QUALITY: OTHER

## 2023-04-03 PROCEDURE — OTHER LASER RESURFACING: OTHER

## 2023-04-03 ASSESSMENT — LOCATION DETAILED DESCRIPTION DERM: LOCATION DETAILED: LEFT INFERIOR CENTRAL MALAR CHEEK

## 2023-04-03 ASSESSMENT — LOCATION ZONE DERM: LOCATION ZONE: FACE

## 2023-04-03 ASSESSMENT — LOCATION SIMPLE DESCRIPTION DERM: LOCATION SIMPLE: LEFT CHEEK

## 2023-04-03 NOTE — PROCEDURE: OTHER (COSMETIC)
Other (Free Text): Settings:\\nfull face -- dwell time: 1500usec; spacinum (single); Double stack to perioral lines, medial cheeks, glabella, lateral forehead scar; double pass to to isolated brown spots\\n\\nPeriorbital -- dwell time: 1000usec; spacinum (single pass)\\n\\nTx #1: Cosmetic fee of $500 for CO2 laser full face (Discounted by $1000).\\n\\nTet/lido 6/6% lot # [id number], exp: 23. Other (Free Text): Settings:\\nfull face -- dwell time: 1500usec; spacinum (single); Double stack to perioral lines, medial cheeks, glabella, lateral forehead scar; double pass to to isolated brown spots\\n\\nPeriorbital -- dwell time: 1000usec; spacinum (single pass)\\n\\nTx #1: Cosmetic fee of $500 for CO2 laser full face (Discounted by $1000).\\n\\nTet/lido 6/6% lot # [id number], exp: 23.

## 2023-04-03 NOTE — PROCEDURE: LASER RESURFACING
Power (Chandra): 25
Percent Coverage Per Pass (%): 0
Consent: Written consent obtained, risks reviewed including but not limited to crusting, scabbing, blistering, scarring, darker or lighter pigmentary change, incomplete improvement of dyschromia, wrinkles, prolonged erythema and facial swelling, infection and bleeding.
External Cooling Fan Speed: 5
Length Of Topical Anesthesia Application (Optional): 45 minutes
Number Of Passes: 1
Corneal Shield Text: A corneal shield was inserted after appropriate application of topical anesthesia.
Detail Level: Zone
Anesthesia Type: 1% lidocaine with epinephrine
Laser Type: CO2 laser
Post-Care Instructions: I reviewed with the patient in detail post-care instructions. Patient should avoid sun until area fully healed. Pt should apply vaseline to treated areas, and remove crusts gently with water-vinegar soaks.
Was A Corneal Shield Used?: No
Topical Anesthesia?: 7% lidocaine, 7% tetracaine
Wavelength: 10,600nm

## 2023-04-13 ENCOUNTER — APPOINTMENT (OUTPATIENT)
Dept: URBAN - METROPOLITAN AREA SURGERY 9 | Age: 73
Setting detail: DERMATOLOGY
End: 2023-04-14

## 2023-04-13 DIAGNOSIS — Z41.9 ENCOUNTER FOR PROCEDURE FOR PURPOSES OTHER THAN REMEDYING HEALTH STATE, UNSPECIFIED: ICD-10-CM

## 2023-04-13 PROCEDURE — OTHER COSMETIC FOLLOW-UP: OTHER

## 2023-04-13 ASSESSMENT — LOCATION ZONE DERM: LOCATION ZONE: FACE

## 2023-04-13 ASSESSMENT — LOCATION DETAILED DESCRIPTION DERM: LOCATION DETAILED: LEFT INFERIOR CENTRAL MALAR CHEEK

## 2023-04-13 ASSESSMENT — LOCATION SIMPLE DESCRIPTION DERM: LOCATION SIMPLE: LEFT CHEEK

## 2023-04-13 NOTE — PROCEDURE: COSMETIC FOLLOW-UP
Comments (Free Text): recommended alastin nectar to use BID until last layer of peeling complete.  She will also use Cicalfate regularly.  Reassured that lines of demarcation will resolve.  Pt aware she will be more red in areas treatment was more aggressive (perioral region, medial cheeks).  Re-check in 6 weeks.
Patient Satisfaction: Pleased
Detail Level: Zone
Side Effects Or Complications: None
Treatment Override (Free Text): DOT (CO2) laser
Global Improvement: Good

## 2023-04-13 NOTE — HPI: COSMETIC FOLLOW UP
How Did You Tolerate The Procedure?: well, without problems
What Condition Are We Treating?: Wrinkles
What Procedure Did We Perform At The Last Visit?: DOT (CO2) laser

## 2023-04-19 ENCOUNTER — RX ONLY (RX ONLY)
Age: 73
End: 2023-04-19

## 2023-04-19 RX ORDER — DESONIDE 0.5 MG/G
CREAM TOPICAL
Qty: 15 | Refills: 0 | Status: ERX | COMMUNITY
Start: 2023-04-19

## 2023-05-15 ENCOUNTER — APPOINTMENT (OUTPATIENT)
Dept: URBAN - METROPOLITAN AREA SURGERY 9 | Age: 73
Setting detail: DERMATOLOGY
End: 2023-05-17

## 2023-05-15 DIAGNOSIS — Z41.9 ENCOUNTER FOR PROCEDURE FOR PURPOSES OTHER THAN REMEDYING HEALTH STATE, UNSPECIFIED: ICD-10-CM

## 2023-05-15 DIAGNOSIS — L82.1 OTHER SEBORRHEIC KERATOSIS: ICD-10-CM

## 2023-05-15 PROCEDURE — OTHER COSMETIC FOLLOW-UP: OTHER

## 2023-05-15 PROCEDURE — OTHER LIQUID NITROGEN (COSMETIC): OTHER

## 2023-05-15 ASSESSMENT — LOCATION DETAILED DESCRIPTION DERM
LOCATION DETAILED: RIGHT SUPERIOR CENTRAL BUCCAL CHEEK
LOCATION DETAILED: LEFT INFERIOR CENTRAL MALAR CHEEK

## 2023-05-15 ASSESSMENT — LOCATION SIMPLE DESCRIPTION DERM
LOCATION SIMPLE: RIGHT CHEEK
LOCATION SIMPLE: LEFT CHEEK

## 2023-05-15 ASSESSMENT — LOCATION ZONE DERM: LOCATION ZONE: FACE

## 2023-05-15 NOTE — PROCEDURE: COSMETIC FOLLOW-UP
Comments (Free Text): Recommended using Alastin Nectar BID. She will also use Cicalfate regularly. \\n\\nPatient feels fullness on the marionette region, primarily on the left side (she states if feels lumpy). Reassured that it is scar tissue within deep creases.\\n\\nPatients smile lines have improved (compared before and after photos). There is no edema present today.\\n\\nConsider another DOT laser in 6 months, or consider filler to address the perioral lines in lance detail.\\n\\nWill discuss anti-aging products at f/u visit.
Patient Satisfaction: Pleased
Detail Level: Zone
Side Effects Or Complications: None
Treatment Override (Free Text): DOT (CO2) laser
Global Improvement: Good

## 2023-05-15 NOTE — PROCEDURE: LIQUID NITROGEN (COSMETIC)
Post-Care Instructions: I reviewed with the patient in detail post-care instructions. Patient is to wear sunprotection, and avoid picking at any of the treated lesions. Pt may apply Vaseline to crusted or scabbing areas.
Detail Level: Detailed
Spray Paint Technique: No
Show Spray Paint Technique Variable?: Yes
Billing Information: Bill by Static Price
Spray Paint Text: The liquid nitrogen was applied to the skin utilizing a spray paint frosting technique.
Consent: The patient's consent was obtained including but not limited to risks of crusting, scabbing, blistering, scarring, darker or lighter pigmentary change, recurrence, incomplete removal and infection. The patient understands that the procedure is cosmetic in nature and is not covered by insurance.

## 2023-05-25 ENCOUNTER — APPOINTMENT (OUTPATIENT)
Dept: URBAN - METROPOLITAN AREA SURGERY 9 | Age: 73
Setting detail: DERMATOLOGY
End: 2023-05-25

## 2023-05-25 DIAGNOSIS — Z41.9 ENCOUNTER FOR PROCEDURE FOR PURPOSES OTHER THAN REMEDYING HEALTH STATE, UNSPECIFIED: ICD-10-CM

## 2023-05-25 PROCEDURE — OTHER COSMETIC FOLLOW-UP: OTHER

## 2023-05-25 ASSESSMENT — LOCATION DETAILED DESCRIPTION DERM: LOCATION DETAILED: LEFT CENTRAL MALAR CHEEK

## 2023-05-25 ASSESSMENT — LOCATION SIMPLE DESCRIPTION DERM: LOCATION SIMPLE: LEFT CHEEK

## 2023-05-25 ASSESSMENT — LOCATION ZONE DERM: LOCATION ZONE: FACE

## 2023-05-25 NOTE — PROCEDURE: COSMETIC FOLLOW-UP
Comments (Free Text): Discussed anti aging regimen. Recommended new treatment cream through [employer] (employee cost) that is to be used in place of retinol (Melatonik serum) as well as the 20% Vitamin C serum from Obagi.
Treatment (Optional): Laser Resurfacing
Detail Level: Zone

## 2023-10-16 ENCOUNTER — APPOINTMENT (OUTPATIENT)
Dept: URBAN - METROPOLITAN AREA SURGERY 9 | Age: 73
Setting detail: DERMATOLOGY
End: 2023-10-16

## 2023-10-16 DIAGNOSIS — L57.0 ACTINIC KERATOSIS: ICD-10-CM

## 2023-10-16 PROCEDURE — OTHER LIQUID NITROGEN: OTHER

## 2023-10-16 PROCEDURE — 17000 DESTRUCT PREMALG LESION: CPT

## 2023-10-16 PROCEDURE — OTHER MIPS QUALITY: OTHER

## 2023-10-16 PROCEDURE — OTHER COUNSELING: OTHER

## 2023-10-16 PROCEDURE — 17003 DESTRUCT PREMALG LES 2-14: CPT

## 2023-10-16 ASSESSMENT — LOCATION SIMPLE DESCRIPTION DERM: LOCATION SIMPLE: LEFT FOREHEAD

## 2023-10-16 ASSESSMENT — LOCATION DETAILED DESCRIPTION DERM
LOCATION DETAILED: LEFT SUPERIOR FOREHEAD
LOCATION DETAILED: LEFT LATERAL FOREHEAD

## 2023-10-16 ASSESSMENT — LOCATION ZONE DERM: LOCATION ZONE: FACE

## 2023-10-16 NOTE — PROCEDURE: LIQUID NITROGEN
Consent: The patient's consent was obtained including but not limited to risks of crusting, blistering, scarring, pigmentary change.
Detail Level: Zone
Render Post-Care Instructions In Note?: no
Total Number Of Aks Treated: 3
Duration Of Freeze Thaw-Cycle (Seconds): 5
Post-Care Instructions: Pt may apply Vaseline to crusted or scabbing areas.
Number Of Freeze-Thaw Cycles: 1 freeze-thaw cycle

## 2023-10-16 NOTE — HPI: SKIN LESION
Is This A New Presentation, Or A Follow-Up?: Skin Lesion
Additional History: Pt states she had CO2 laser on face and this spot did not get better. Also says she uses moisturizers.

## 2023-10-16 NOTE — PROCEDURE: COUNSELING
Patient Specific Counseling (Will Not Stick From Patient To Patient): Counseled on 5FU cream, patient prefers LN2 treatment
Detail Level: Simple

## 2024-10-28 ENCOUNTER — APPOINTMENT (OUTPATIENT)
Dept: URBAN - METROPOLITAN AREA SURGERY 9 | Age: 74
Setting detail: DERMATOLOGY
End: 2024-10-28

## 2024-10-28 DIAGNOSIS — Z41.9 ENCOUNTER FOR PROCEDURE FOR PURPOSES OTHER THAN REMEDYING HEALTH STATE, UNSPECIFIED: ICD-10-CM

## 2024-10-28 DIAGNOSIS — L98.8 OTHER SPECIFIED DISORDERS OF THE SKIN AND SUBCUTANEOUS TISSUE: ICD-10-CM

## 2024-10-28 PROCEDURE — OTHER BOTOX: OTHER

## 2024-10-28 PROCEDURE — OTHER FACIAL: OTHER

## 2024-10-28 PROCEDURE — OTHER DERMAPLANE: OTHER

## 2024-10-28 PROCEDURE — OTHER OTHER: OTHER

## 2024-10-28 PROCEDURE — OTHER COSMETIC CONSULTATION: FILLERS: OTHER

## 2024-10-28 ASSESSMENT — LOCATION DETAILED DESCRIPTION DERM
LOCATION DETAILED: LEFT INFERIOR CENTRAL MALAR CHEEK
LOCATION DETAILED: RIGHT INFERIOR CENTRAL MALAR CHEEK

## 2024-10-28 ASSESSMENT — LOCATION SIMPLE DESCRIPTION DERM
LOCATION SIMPLE: RIGHT CHEEK
LOCATION SIMPLE: LEFT CHEEK

## 2024-10-28 ASSESSMENT — LOCATION ZONE DERM: LOCATION ZONE: FACE

## 2024-10-28 NOTE — PROCEDURE: OTHER
Render Risk Assessment In Note?: no
Detail Level: Zone
Note Text (......Xxx Chief Complaint.): This diagnosis correlates with the
Other (Free Text): 48U ($384). Discounted to $8/unit.\\n\\nTreated conservatively in forehead (pt has excess upper eyelid skin, left>>>right).  Kept injections superficial in forehead to allow for a more natural look and not to freeze completely.  She will still be able to raise her forehead based on how she was injected today.
Other (Free Text): Discussed Refyne for marionettes and RHA Redensity to address the etchy lines in lower cheek region and perioral region.

## 2024-10-28 NOTE — PROCEDURE: BOTOX
Detail Level: Detailed
Lateral Platysmal Bands Units: 0
Show Lcl Units: No
Show Forehead Units: Yes
Glabellar Complex Units: 20
Show Inventory Tab: Show
Forehead Units: 4
Additional Area 1 Location: left anterior platysmal band
Consent: Written consent obtained. Risks include but not limited to lid/brow ptosis, bruising, swelling, diplopia, temporary effect, incomplete chemical denervation.
Dilution (U/0.1 Cc): 2.6
Periorbital Skin Units: 24
Incrementing Botox Units: By 0.5 Units

## 2024-10-28 NOTE — PROCEDURE: DERMAPLANE
Post-Care Instructions: I reviewed with the patient in detail post-care instructions.
Treatment Areas: face and neck
Price (Use Numbers Only, No Special Characters Or $): 95.00
Detail Level: Zone
Pre-Procedure Text: The patient was placed in a recumbant position on the procedure table.
Post-Procedure Instructions: Following the dermaplane procedure, Oxymist treatment was applied to the treatment areas. Moisturizer and SPF was applied.
Treatment Area Prep: acetone
Blade: [place] scalpel

## 2024-10-28 NOTE — PROCEDURE: FACIAL
Facial Steaming: steamed
Mask Type (Optional): calming
Treatment Type (Optional): Anti Aging Facial
Treatment Serum (Optional): Susan-Antioxidant
Extraction Method: extractor
Detail Level: Zone
Exfoliation Type: enzyme
High Frequency Facial (Optional): argon (violet)

## 2025-01-14 ENCOUNTER — APPOINTMENT (OUTPATIENT)
Dept: URBAN - METROPOLITAN AREA SURGERY 9 | Age: 75
Setting detail: DERMATOLOGY
End: 2025-01-14

## 2025-01-14 DIAGNOSIS — L81.4 OTHER MELANIN HYPERPIGMENTATION: ICD-10-CM

## 2025-01-14 DIAGNOSIS — L57.0 ACTINIC KERATOSIS: ICD-10-CM

## 2025-01-14 DIAGNOSIS — D22 MELANOCYTIC NEVI: ICD-10-CM

## 2025-01-14 DIAGNOSIS — D18.0 HEMANGIOMA: ICD-10-CM

## 2025-01-14 DIAGNOSIS — L82.1 OTHER SEBORRHEIC KERATOSIS: ICD-10-CM

## 2025-01-14 PROBLEM — D22.5 MELANOCYTIC NEVI OF TRUNK: Status: ACTIVE | Noted: 2025-01-14

## 2025-01-14 PROBLEM — D18.01 HEMANGIOMA OF SKIN AND SUBCUTANEOUS TISSUE: Status: ACTIVE | Noted: 2025-01-14

## 2025-01-14 PROCEDURE — 17000 DESTRUCT PREMALG LESION: CPT

## 2025-01-14 PROCEDURE — 99213 OFFICE O/P EST LOW 20 MIN: CPT | Mod: 25

## 2025-01-14 PROCEDURE — OTHER SUNSCREEN RECOMMENDATIONS: OTHER

## 2025-01-14 PROCEDURE — 17003 DESTRUCT PREMALG LES 2-14: CPT

## 2025-01-14 PROCEDURE — OTHER REASSURANCE: OTHER

## 2025-01-14 PROCEDURE — OTHER LIQUID NITROGEN: OTHER

## 2025-01-14 PROCEDURE — OTHER OTHER: OTHER

## 2025-01-14 PROCEDURE — OTHER COUNSELING: OTHER

## 2025-01-14 PROCEDURE — OTHER MIPS QUALITY: OTHER

## 2025-01-14 ASSESSMENT — LOCATION DETAILED DESCRIPTION DERM
LOCATION DETAILED: RIGHT SUPERIOR MEDIAL LOWER BACK
LOCATION DETAILED: LEFT INFERIOR LATERAL FOREHEAD
LOCATION DETAILED: EPIGASTRIC SKIN
LOCATION DETAILED: RIGHT LATERAL FOREHEAD
LOCATION DETAILED: RIGHT CENTRAL TEMPLE

## 2025-01-14 ASSESSMENT — LOCATION SIMPLE DESCRIPTION DERM
LOCATION SIMPLE: RIGHT LOWER BACK
LOCATION SIMPLE: ABDOMEN
LOCATION SIMPLE: LEFT FOREHEAD
LOCATION SIMPLE: RIGHT TEMPLE
LOCATION SIMPLE: RIGHT FOREHEAD

## 2025-01-14 ASSESSMENT — LOCATION ZONE DERM
LOCATION ZONE: FACE
LOCATION ZONE: TRUNK

## 2025-01-14 NOTE — PROCEDURE: LIQUID NITROGEN
Consent: The patient's consent was obtained including but not limited to risks of crusting, scabbing, blistering, scarring, darker or lighter pigmentary change, recurrence, incomplete removal and infection.
Post-Care Instructions: I reviewed with the patient in detail post-care instructions. Patient is to wear sunprotection, and avoid picking at any of the treated lesions. Pt may apply Vaseline to crusted or scabbing areas.
Duration Of Freeze Thaw-Cycle (Seconds): 6
Number Of Freeze-Thaw Cycles: 1 freeze-thaw cycle
Detail Level: Simple
Render Post-Care Instructions In Note?: no
Total Number Of Aks Treated: 2

## 2025-01-14 NOTE — PROCEDURE: OTHER
Note Text (......Xxx Chief Complaint.): This diagnosis correlates with the cysts on scalp
Detail Level: Detailed
Render Risk Assessment In Note?: no
Other (Free Text): LN 2

## 2025-09-02 ENCOUNTER — APPOINTMENT (OUTPATIENT)
Dept: URBAN - METROPOLITAN AREA SURGERY 9 | Age: 75
Setting detail: DERMATOLOGY
End: 2025-09-02

## 2025-09-02 DIAGNOSIS — L73.8 OTHER SPECIFIED FOLLICULAR DISORDERS: ICD-10-CM

## 2025-09-02 DIAGNOSIS — Z41.9 ENCOUNTER FOR PROCEDURE FOR PURPOSES OTHER THAN REMEDYING HEALTH STATE, UNSPECIFIED: ICD-10-CM

## 2025-09-02 PROCEDURE — OTHER BOTOX: OTHER

## 2025-09-02 PROCEDURE — OTHER OTHER: OTHER

## 2025-09-02 ASSESSMENT — LOCATION DETAILED DESCRIPTION DERM: LOCATION DETAILED: NASAL ROOT

## 2025-09-02 ASSESSMENT — LOCATION SIMPLE DESCRIPTION DERM: LOCATION SIMPLE: NOSE

## 2025-09-02 ASSESSMENT — LOCATION ZONE DERM: LOCATION ZONE: NOSE
